# Patient Record
Sex: MALE | Race: OTHER | NOT HISPANIC OR LATINO | Employment: FULL TIME | URBAN - METROPOLITAN AREA
[De-identification: names, ages, dates, MRNs, and addresses within clinical notes are randomized per-mention and may not be internally consistent; named-entity substitution may affect disease eponyms.]

---

## 2024-12-15 ENCOUNTER — OFFICE VISIT (OUTPATIENT)
Dept: URGENT CARE | Age: 49
End: 2024-12-15
Payer: COMMERCIAL

## 2024-12-15 ENCOUNTER — ANCILLARY PROCEDURE (OUTPATIENT)
Dept: URGENT CARE | Age: 49
End: 2024-12-15
Payer: COMMERCIAL

## 2024-12-15 VITALS
OXYGEN SATURATION: 94 % | BODY MASS INDEX: 27.77 KG/M2 | DIASTOLIC BLOOD PRESSURE: 75 MMHG | TEMPERATURE: 97.8 F | WEIGHT: 205 LBS | HEART RATE: 69 BPM | SYSTOLIC BLOOD PRESSURE: 112 MMHG | HEIGHT: 72 IN

## 2024-12-15 DIAGNOSIS — R09.89 CHEST CONGESTION: ICD-10-CM

## 2024-12-15 DIAGNOSIS — J06.9 UPPER RESPIRATORY TRACT INFECTION, UNSPECIFIED TYPE: Primary | ICD-10-CM

## 2024-12-15 PROCEDURE — 71046 X-RAY EXAM CHEST 2 VIEWS: CPT

## 2024-12-15 PROCEDURE — 3008F BODY MASS INDEX DOCD: CPT

## 2024-12-15 PROCEDURE — 99212 OFFICE O/P EST SF 10 MIN: CPT

## 2024-12-15 ASSESSMENT — ENCOUNTER SYMPTOMS: COUGH: 1

## 2024-12-15 NOTE — PATIENT INSTRUCTIONS
You were seen at Urgent Care today for chest congestion and cough.  Your chest x-ray is negative for pneumonia.  At this time, I feel your symptoms are most likely viral in nature.  Please treat as discussed.  Monitor for red flags which we spoke about, If your symptoms change, worsen or become concerning in any way, please go to the emergency room immediately, otherwise you can followup with your PCP in 2-3 days as needed

## 2024-12-15 NOTE — PROGRESS NOTES
Subjective   Patient ID: Kahlil Galeano is a 49 y.o. male. They present today with a chief complaint of Cough (Patient complains of cough & chest congestion X1 week . Patient states that he has been exposed to pneumonia and has concerns he could possibly have it as well . ).    History of Present Illness  Patient is a 49-year-old male with history of hyperlipidemia who presents urgent care today with a complaint of chest congestion.  He states his symptoms started approximately 1 week ago.  He notes his son was recently diagnosed with walking pneumonia.  He denies any fevers, chills, nausea, vomiting, chest pain or shortness of breath.  No other complaints or concerns mention at this time.      History provided by:  Patient  Cough        Past Medical History  Allergies as of 12/15/2024 - Reviewed 12/15/2024   Allergen Reaction Noted    Aspirin Unknown 12/15/2024       (Not in a hospital admission)         Past Medical History:   Diagnosis Date    Encounter for immunization 10/03/2013    Need for prophylactic vaccination and inoculation against influenza    Encounter for screening for diabetes mellitus 07/16/2018    Screening for diabetes mellitus    Encounter for screening for lipoid disorders 07/16/2018    Screening for hyperlipidemia    Nasal congestion     Nasal congestion    Personal history of other specified conditions     History of urinary frequency    Wheezing     Wheezing       Past Surgical History:   Procedure Laterality Date    ELBOW SURGERY  09/16/2013    Elbow Surgery    HERNIA REPAIR  09/16/2013    Hernia Repair            Review of Systems  Review of Systems   HENT:  Positive for congestion.    Respiratory:  Positive for cough.                                   Objective    Vitals:    12/15/24 1128   BP: 112/75   BP Location: Left arm   Patient Position: Sitting   Pulse: 69   Temp: 36.6 °C (97.8 °F)   TempSrc: Oral   SpO2: 94%   Weight: 93 kg (205 lb)   Height: 1.829 m (6')     No LMP for male  patient.    Physical Exam  Vitals and nursing note reviewed.   Constitutional:       General: He is not in acute distress.     Appearance: Normal appearance. He is not ill-appearing, toxic-appearing or diaphoretic.   HENT:      Head: Normocephalic and atraumatic.      Nose: Nose normal.      Mouth/Throat:      Mouth: Mucous membranes are moist.   Eyes:      Extraocular Movements: Extraocular movements intact.      Conjunctiva/sclera: Conjunctivae normal.      Pupils: Pupils are equal, round, and reactive to light.   Cardiovascular:      Rate and Rhythm: Normal rate and regular rhythm.      Pulses: Normal pulses.      Heart sounds: Normal heart sounds.   Pulmonary:      Effort: Pulmonary effort is normal. No respiratory distress.      Breath sounds: Normal breath sounds. No stridor. No wheezing, rhonchi or rales.   Chest:      Chest wall: No tenderness.   Musculoskeletal:         General: Normal range of motion.      Cervical back: Normal range of motion and neck supple.   Skin:     General: Skin is warm and dry.      Capillary Refill: Capillary refill takes less than 2 seconds.   Neurological:      General: No focal deficit present.      Mental Status: He is alert and oriented to person, place, and time.   Psychiatric:         Mood and Affect: Mood normal.         Behavior: Behavior normal.         Procedures      Assessment/Plan   Allergies, medications, history, and pertinent labs/EKGs/Imaging reviewed by FINA Sorenson.     Medical Decision Making    Patient is well appearing, afebrile, non toxic, not hypoxic, and appropriate for outpatient treatment and management at time of evaluation. Patient presents with chest congestion and cough x 1 week.     Differential includes but not limited to: Pneumonia, URI, bronchitis, other  Considered viral testing however symptoms have been ongoing for a week.  With history of recent exposure to pneumonia, chest x-ray ordered.  Imaging independently reviewed by  myself interpreted as no acute cardiopulmonary process.    I discussed these findings with the patient.  At this time, I feel his symptoms are secondary to a viral upper respiratory infection and can be treated safely at home with over-the-counter medication as needed.  He will follow-up with his PCP in the next 2 to 3 days if he is not feeling significantly better.  Red flags and ER precautions discussed.  He was discharged in stable condition.  All questions and concerns addressed.           Orders and Diagnoses  There are no diagnoses linked to this encounter.    Medical Admin Record    === 12/15/24 ===    XR CHEST 2 VIEWS    - Impression -  1.  No active cardiopulmonary disease.    Signed by: Taiwo Fowler 12/15/2024 11:52 AM  Dictation workstation:   [id number]    Follow Up Instructions  No follow-ups on file.    Patient disposition: Home    Electronically signed by FINA Sorenson  11:34 AM